# Patient Record
Sex: FEMALE | Race: BLACK OR AFRICAN AMERICAN | NOT HISPANIC OR LATINO
[De-identification: names, ages, dates, MRNs, and addresses within clinical notes are randomized per-mention and may not be internally consistent; named-entity substitution may affect disease eponyms.]

---

## 2022-04-07 ENCOUNTER — APPOINTMENT (OUTPATIENT)
Dept: MATERNAL FETAL MEDICINE | Facility: CLINIC | Age: 37
End: 2022-04-07

## 2022-04-07 ENCOUNTER — APPOINTMENT (OUTPATIENT)
Dept: ANTEPARTUM | Facility: CLINIC | Age: 37
End: 2022-04-07

## 2022-04-19 ENCOUNTER — APPOINTMENT (OUTPATIENT)
Dept: ANTEPARTUM | Facility: CLINIC | Age: 37
End: 2022-04-19

## 2022-04-19 ENCOUNTER — APPOINTMENT (OUTPATIENT)
Dept: MATERNAL FETAL MEDICINE | Facility: CLINIC | Age: 37
End: 2022-04-19
Payer: COMMERCIAL

## 2022-04-19 ENCOUNTER — APPOINTMENT (OUTPATIENT)
Dept: ANTEPARTUM | Facility: CLINIC | Age: 37
End: 2022-04-19
Payer: COMMERCIAL

## 2022-04-19 ENCOUNTER — NON-APPOINTMENT (OUTPATIENT)
Age: 37
End: 2022-04-19

## 2022-04-19 ENCOUNTER — ASOB RESULT (OUTPATIENT)
Age: 37
End: 2022-04-19

## 2022-04-19 VITALS
WEIGHT: 291.44 LBS | OXYGEN SATURATION: 98 % | HEIGHT: 69 IN | HEART RATE: 86 BPM | BODY MASS INDEX: 43.16 KG/M2 | DIASTOLIC BLOOD PRESSURE: 80 MMHG | SYSTOLIC BLOOD PRESSURE: 122 MMHG | RESPIRATION RATE: 18 BRPM

## 2022-04-19 DIAGNOSIS — O34.219 MATERNAL CARE FOR UNSPECIFIED TYPE SCAR FROM PREVIOUS CESAREAN DELIVERY: ICD-10-CM

## 2022-04-19 DIAGNOSIS — O99.211 OBESITY COMPLICATING PREGNANCY, FIRST TRIMESTER: ICD-10-CM

## 2022-04-19 DIAGNOSIS — Z78.9 OTHER SPECIFIED HEALTH STATUS: ICD-10-CM

## 2022-04-19 DIAGNOSIS — Z82.49 FAMILY HISTORY OF ISCHEMIC HEART DISEASE AND OTHER DISEASES OF THE CIRCULATORY SYSTEM: ICD-10-CM

## 2022-04-19 DIAGNOSIS — Z86.59 PERSONAL HISTORY OF OTHER COMPLICATIONS OF PREGNANCY, CHILDBIRTH AND THE PUERPERIUM: ICD-10-CM

## 2022-04-19 DIAGNOSIS — Z3A.11 11 WEEKS GESTATION OF PREGNANCY: ICD-10-CM

## 2022-04-19 DIAGNOSIS — O99.841 BARIATRIC SURGERY STATUS COMPLICATING PREGNANCY, FIRST TRIMESTER: ICD-10-CM

## 2022-04-19 DIAGNOSIS — E55.9 VITAMIN D DEFICIENCY, UNSPECIFIED: ICD-10-CM

## 2022-04-19 DIAGNOSIS — E66.01 MORBID (SEVERE) OBESITY DUE TO EXCESS CALORIES: ICD-10-CM

## 2022-04-19 DIAGNOSIS — Z90.3 ACQUIRED ABSENCE OF STOMACH [PART OF]: ICD-10-CM

## 2022-04-19 DIAGNOSIS — Z87.59 PERSONAL HISTORY OF OTHER COMPLICATIONS OF PREGNANCY, CHILDBIRTH AND THE PUERPERIUM: ICD-10-CM

## 2022-04-19 DIAGNOSIS — E66.01 OBESITY COMPLICATING PREGNANCY, FIRST TRIMESTER: ICD-10-CM

## 2022-04-19 DIAGNOSIS — Z80.6 FAMILY HISTORY OF LEUKEMIA: ICD-10-CM

## 2022-04-19 DIAGNOSIS — O99.011 ANEMIA COMPLICATING PREGNANCY, FIRST TRIMESTER: ICD-10-CM

## 2022-04-19 PROCEDURE — 99205 OFFICE O/P NEW HI 60 MIN: CPT

## 2022-04-19 PROCEDURE — 76801 OB US < 14 WKS SINGLE FETUS: CPT

## 2022-04-19 RX ORDER — FERROUS SULFATE 325(65) MG
325 (65 FE) TABLET ORAL
Refills: 0 | Status: ACTIVE | COMMUNITY

## 2022-04-19 RX ORDER — MV-MIN/FOLIC/VIT K/LYCOP/COQ10 200-100MCG
CAPSULE ORAL
Refills: 0 | Status: ACTIVE | COMMUNITY

## 2022-04-19 RX ORDER — UBIDECARENONE/VIT E ACET 100MG-5
25 MCG CAPSULE ORAL
Refills: 0 | Status: ACTIVE | COMMUNITY

## 2022-04-19 NOTE — CHIEF COMPLAINT
[G ___] : G [unfilled] [P ___] : P [unfilled] [de-identified] : having a gastric sleeve surgical procedure prior to pregnancy

## 2022-04-19 NOTE — VITALS
[US Date: ___] : ultrasound performed on [unfilled]. [GA= ___ Weeks] : Results were GA of [unfilled] weeks [GA= ___ Days] : and [unfilled] day(s) [RICARDO by US (date): ___] : The calculated RICARDO by US is [unfilled]

## 2022-04-19 NOTE — FAMILY HISTORY
[Age 35+ During Pregnancy] : not 35 or over during pregnancy [Reported Family History Of Birth Defects] : no congenital heart defects [Eddie-Sachs Carrier] : no Eddie-Sachs [Family History] : no mental retardation/autism [Reported Family History Of Genetic Disease] : no history of child defect in child of baby father

## 2022-04-19 NOTE — OB HISTORY
[Pregnancy History] : patient received anesthesia [Spontaneous] : Spontaneous conception [Sonogram] : sonogram [at ___ wks] : at [unfilled] weeks [Definite:  ___ (Date)] : the last menstrual period was [unfilled] [Normal Amount/Duration] : was of a normal amount and duration [Regular Cycle Intervals] : periods have been regular [Frequency: Q ___ days] : menstrual periods occur approximately every [unfilled] days [Menarche Age: ____] : age at menarche was [unfilled] [___] : Living: [unfilled] [FreeTextEntry1] : Her prenatal record was not available for review. [Spotting Between  Menses] : no spotting between menses [Menstrual Cramps] : no menstrual cramps [On BCP at conception] : the patient was not on BCP at conception

## 2022-04-19 NOTE — DISCUSSION/SUMMARY
[FreeTextEntry1] : She is 11 weeks and 1 day gestation by today's ultrasound examination dating.  There was an 8-day difference between the LMP dates and today's fetal CRL measurement dates. \par \par She is overweight and obesity has been associated with a number of maternal complications such as gestational diabetes, pre-eclampsia, thrombophlebitis, labor abnormalities, post-term pregnancies,  delivery, and operative complications. Obesity has been associated with adverse fetal outcomes such as late stillbirth and  deliveries.  Obese women also have a two to three-fold increased incidence in congenital anomalies. \par \par She had a gastric sleeve surgical procedure on 2021. I told her that recent studies have found an increased risk of  birth and small for gestational age infants after bariatric surgery. Earlier studies had reported that obstetric and  outcomes after bariatric surgery were similar to those of the general obstetrical population.  I also told her that after bariatric surgery women are at increased risk for nutritional deficiencies such as iron, calcium, vitamin B12, vitamin D and folate. She told me that she has been diagnosed with iron deficiency anemia and vitamin D deficiency.  She currently takes one prenatal vitamin daily, vitamin D 1000 mg daily, and daily iron supplementation.  I gave her a referral to have a nutritional consultation during pregnancy. \par \par She states that she was diagnosed with iron deficiency anemia and was prescribed daily iron supplementation. I advised her to continue taking her prenatal vitamin and the prescribed iron supplementation. She was advised to increase her vitamin C intake. I told her that red meat, poultry, and fish are dietary sources of iron. I told her that anemia during pregnancy increases the risk of having a  delivery or a low birth weight baby.\par \par She states that she has vitamin D deficiency and is taking daily vitamin D supplementation.  I told her that vitamin D deficiency during pregnancy has been associated with an increased risk for gestational diabetes, pre-eclampsia, small for gestational age infants, low birth weight infants, bacterial vaginosis, and  births.  She was advised to continue her vitamin D daily supplementation and increase her exposure to sunlight.\par \par She had a prior  section delivery. She was informed of the risks and benefits of a trial of labor. She was informed of the risk of uterine rupture and the associated maternal complications such as hysterectomy, blood transfusions, and intensive care unit admission. She was also informed of the associated  adverse outcomes in cases of uterine rupture such as stillbirth, fetal hypoxia and neurological impairment (cerebral palsy). She expressed a desire to have a repeat  section birth with the current pregnancy.  \par \par I discussed the general topic of prenatal diagnosis. She was told that all pregnancies have a 2 to 3% risk of having a baby born with a birth defect, and a 1 to 2 % risk of having a baby born with developmental problems that cannot be diagnosed during pregnancy. I told her that there are two types of birth defects, structural and chromosomal. She was made aware that prenatal diagnosis is available to determine whether the fetus she is carrying has normal or abnormal chromosomes, and major fetal structural abnormalities. I discussed the various screening and diagnostic tests used for identifying fetal chromosomal abnormalities. I explained the difference between screening and diagnostic tests.  She was made aware of the small risk of miscarriage associated with the diagnostic procedures, the limitations of the procedures, and the alternative of not having the procedures. All of her questions were answered.  She was offered genetic counseling and agreed to have genetic counseling.  I gave her a referral to have the genetic counseling done as soon as possible. She was scheduled to have a detailed fetal anatomy ultrasound examination in 9 weeks.\par \par I told her that I do not recommend a glucola challenge test to screen for gestational diabetes because of her bariatric surgical procedure. I recommended having the COVID 19 vaccine during pregnancy if not already vaccinated.  She told me that she received the two COVID-19 vaccine injections during  and 2021.  I also recommended having a COVID 19 vaccine booster during pregnancy if vaccinated and no booster vaccine after 6 months from the last vaccine injection. She told me that she is going to get the COVID-19 vaccine booster as soon as possible.  I recommended the Tdap vaccine between 27 and 36 weeks of gestation to prevent pertussis infection in the  infant. I recommended the flu vaccine during flu season (October through May). She should have serial ultrasounds to evaluate fetal growth and development.  I also suggest fetal surveillance during the third trimester of pregnancy with weekly NSTs/BPPs starting at 36 weeks gestation.  She can also perform daily fetal movement counts as an adjunct to the NSTs/BPPs.

## 2022-04-20 ENCOUNTER — ASOB RESULT (OUTPATIENT)
Age: 37
End: 2022-04-20

## 2022-04-20 ENCOUNTER — APPOINTMENT (OUTPATIENT)
Dept: MATERNAL FETAL MEDICINE | Facility: CLINIC | Age: 37
End: 2022-04-20
Payer: COMMERCIAL

## 2022-04-20 DIAGNOSIS — Z82.79 FAMILY HISTORY OF OTHER CONGENITAL MALFORMATIONS, DEFORMATIONS AND CHROMOSOMAL ABNORMALITIES: ICD-10-CM

## 2022-04-20 PROCEDURE — 99212 OFFICE O/P EST SF 10 MIN: CPT | Mod: 95

## 2022-04-26 ENCOUNTER — ASOB RESULT (OUTPATIENT)
Age: 37
End: 2022-04-26

## 2022-04-26 ENCOUNTER — APPOINTMENT (OUTPATIENT)
Dept: MATERNAL FETAL MEDICINE | Facility: CLINIC | Age: 37
End: 2022-04-26
Payer: COMMERCIAL

## 2022-04-26 PROCEDURE — 97802 MEDICAL NUTRITION INDIV IN: CPT | Mod: 95

## 2022-05-20 ENCOUNTER — APPOINTMENT (OUTPATIENT)
Dept: MATERNAL FETAL MEDICINE | Facility: CLINIC | Age: 37
End: 2022-05-20

## 2022-05-23 ENCOUNTER — NON-APPOINTMENT (OUTPATIENT)
Age: 37
End: 2022-05-23

## 2022-06-21 ENCOUNTER — APPOINTMENT (OUTPATIENT)
Dept: ANTEPARTUM | Facility: CLINIC | Age: 37
End: 2022-06-21
Payer: COMMERCIAL

## 2022-06-21 ENCOUNTER — ASOB RESULT (OUTPATIENT)
Age: 37
End: 2022-06-21

## 2022-06-21 PROCEDURE — 76811 OB US DETAILED SNGL FETUS: CPT

## 2022-07-05 ENCOUNTER — APPOINTMENT (OUTPATIENT)
Dept: ANTEPARTUM | Facility: CLINIC | Age: 37
End: 2022-07-05